# Patient Record
Sex: MALE | Race: WHITE | HISPANIC OR LATINO
[De-identification: names, ages, dates, MRNs, and addresses within clinical notes are randomized per-mention and may not be internally consistent; named-entity substitution may affect disease eponyms.]

---

## 2022-08-29 PROBLEM — Z00.00 ENCOUNTER FOR PREVENTIVE HEALTH EXAMINATION: Status: ACTIVE | Noted: 2022-08-29

## 2022-08-30 ENCOUNTER — APPOINTMENT (OUTPATIENT)
Dept: ORTHOPEDIC SURGERY | Facility: CLINIC | Age: 27
End: 2022-08-30

## 2022-08-30 DIAGNOSIS — M72.2 PLANTAR FASCIAL FIBROMATOSIS: ICD-10-CM

## 2022-08-30 PROCEDURE — 73630 X-RAY EXAM OF FOOT: CPT | Mod: RT

## 2022-08-30 PROCEDURE — 99203 OFFICE O/P NEW LOW 30 MIN: CPT

## 2022-08-30 NOTE — HISTORY OF PRESENT ILLNESS
[Right Leg] : right leg [Gradual] : gradual [Sudden] : sudden [5] : 5 [4] : 4 [Burning] : burning [Shooting] : shooting [Stabbing] : stabbing [Intermittent] : intermittent [Exercising] : exercising [Full time] : Work status: full time [de-identified] : 8/30/22: 1 month plantar heel pain. no injury. no tx to date. no prior foot probs. 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

## 2022-08-30 NOTE — IMAGING
[de-identified] : RLE \par Inspection of the ankle, foot and lower leg is as follows: no abrasions or lacerations, no swelling, no\par erythema and no gross deformity\par Palpation of the ankle, foot and lower leg is as follows: Tenderness at plantar fascia insertion.\par Range of motion of the ankle, foot and lower leg is as follows: dorsiflexion to 20 degrees, plantar flexion to 40 degrees,\par inversion to 30 degrees and eversion to 20 degrees. Strength testing of the ankle, foot and lower leg is as\par follows: Ankle dorsiflexion strength is 5/5, Ankle plantar flexion strength is 5/5, Ankle inversion strength is 5/5 and Ankle\par eversion strength is 5/5. Special Testing of the ankle, foot and lower leg are as follows: no pain with heel compression.\par Vascular testing of the ankle, foot and lower leg are as follows: Dorsalis Pedis (DP) Pulse is 2+.\par Sensation testing of the ankle, foot and lower leg are as follows: Sensation present to light touch in all distributions.\par  [Right] : right foot [Weight -] : weightbearing [There are no fractures, subluxations or dislocations. No significant abnormalities are seen] : There are no fractures, subluxations or dislocations. No significant abnormalities are seen

## 2022-08-30 NOTE — ASSESSMENT
[FreeTextEntry1] : The nature of plantar fasciits was discussed with the patient, as was its typically self-limiting nature. The patient was advised to wear gel heel pads, use nsaids if able, and recommended to see physical therapy for a stretching program.\par \par f/up after PT if not resolved